# Patient Record
Sex: MALE | Race: WHITE | NOT HISPANIC OR LATINO | URBAN - METROPOLITAN AREA
[De-identification: names, ages, dates, MRNs, and addresses within clinical notes are randomized per-mention and may not be internally consistent; named-entity substitution may affect disease eponyms.]

---

## 2018-05-22 ENCOUNTER — OFFICE (OUTPATIENT)
Dept: URBAN - METROPOLITAN AREA CLINIC 11 | Facility: CLINIC | Age: 58
End: 2018-05-22

## 2018-05-22 VITALS
HEIGHT: 71 IN | SYSTOLIC BLOOD PRESSURE: 137 MMHG | HEART RATE: 72 BPM | WEIGHT: 197 LBS | DIASTOLIC BLOOD PRESSURE: 87 MMHG

## 2018-05-22 DIAGNOSIS — K21.9 GASTRO-ESOPHAGEAL REFLUX DISEASE WITHOUT ESOPHAGITIS: ICD-10-CM

## 2018-05-22 DIAGNOSIS — E66.3 OVERWEIGHT: ICD-10-CM

## 2018-05-22 PROCEDURE — 99244 OFF/OP CNSLTJ NEW/EST MOD 40: CPT | Performed by: NURSE PRACTITIONER

## 2018-05-22 RX ORDER — SODIUM PICOSULFATE, MAGNESIUM OXIDE, AND ANHYDROUS CITRIC ACID 10; 3.5; 12 MG/160ML; G/160ML; G/160ML
LIQUID ORAL
Qty: 1 | Refills: 0 | Status: ACTIVE
Start: 2018-05-22

## 2018-05-22 NOTE — SERVICEHPINOTES
Aakash Perkins   is a   58   year old  male   here today at the request of Dr. Hancock for evaluation of chronic reflux and for colon cancer screening. The patient notes a 2 year history of reflux and heartburn for which he has been taking over-the-counter ranitidine with good control of his symptoms.  A couple of weeks ago he was placed on ranitidine 150 mg once daily and this also control the symptoms.  He denies any breakthrough heartburn or reflux and there has been no dysphagia.  He denies chest pain or weight loss.  He has never had a colonoscopy and has no family history of colon cancer or polyps.  He notes regular stools without melena or hematochezia.